# Patient Record
(demographics unavailable — no encounter records)

---

## 2025-06-26 NOTE — PHYSICAL EXAM
[de-identified] : Physical exam demonstrates the patient to be alert and oriented x 3 and capable of ambulation. The patient is well-developed and well-nourished in no apparent respiratory distress. The majority of the skin is intact bilaterally in the upper extremities without any bilateral elbow lymphadenopathy.  Full, symmetric digital range of motion.  Tender to palpation over the right thumb A1 pulley, locking can be appreciated.  Nontender over the MCP joint/collateral ligament.  No collateral ligament instability or extensor tendon subluxation.  Flexor and extensor tendons intact.  Sensation is intact to light touch.  All fingers are well-perfused.  [de-identified] : PA, lateral and oblique x-rays of the right thumb were obtained today to assess for bony injury.  No evidence of acute fracture or dislocation.

## 2025-06-26 NOTE — HISTORY OF PRESENT ILLNESS
[FreeTextEntry1] : The patient presents for follow up visit in regard to a new concern involving her right thumb tightness, pain and "pulling" sensation that stated a few weeks ago. The patient states that she constantly using a knife at work and feels discomfort when applying pressure.  The patient states that this limits her normal activities.   The patient underwent a right and left carpal tunnel injection on May 1, 2025, with relief of symptoms.  The patient also underwent a right and left long finger trigger finger injection at the same visit.

## 2025-06-26 NOTE — PROCEDURE
[FreeTextEntry1] : My impression is that the patient has stenosing tenosynovitis of the right thumb. We discussed treatment options and she elected to undergo a trigger thumb injection. The risks, benefits, and alternatives were discussed with the patient. This included, but was not limited to nerve injury, infection, subcutaneous atrophy, skin depigmentation etc. Under informed consent and sterile conditions the flexor tendon sheath at the A1 pulley was injected with a combination of 1/2 cc Kenalog-10 and 1/2 cc of 2% plain lidocaine. It is my hopes that this significantly alleviates the patients symptoms.  11.2   7.50  )-----------( 95       ( 20 Apr 2021 06:44 )             37.1       04-19    141  |  103  |  30<H>  ----------------------------<  126<H>  4.4   |  25  |  1.09    Ca    9.4      19 Apr 2021 21:34    TPro  6.9  /  Alb  3.6  /  TBili  0.5  /  DBili  x   /  AST  23  /  ALT  16  /  AlkPhos  95  04-19              Urinalysis Basic - ( 19 Apr 2021 23:22 )    Color: Yellow / Appearance: Clear / SG: >=1.030 / pH: x  Gluc: x / Ketone: Trace mg/dL  / Bili: Negative / Urobili: 0.2 E.U./dL   Blood: x / Protein: 100 mg/dL / Nitrite: POSITIVE   Leuk Esterase: NEGATIVE / RBC: 5-10 /HPF / WBC 5-10 /HPF   Sq Epi: x / Non Sq Epi: Moderate /HPF / Bacteria: Present /HPF        PT/INR - ( 19 Apr 2021 21:34 )   PT: 15.0 sec;   INR: 1.26          PTT - ( 19 Apr 2021 21:34 )  PTT:33.3 sec    Lactate Trend  04-20 @ 01:37 Lactate:1.7   04-19 @ 21:35 Lactate:2.9       CARDIAC MARKERS ( 19 Apr 2021 21:34 )  x     / 0.08 ng/mL / 77 U/L / x     / 1.8 ng/mL        CAPILLARY BLOOD GLUCOSE

## 2025-06-26 NOTE — ASSESSMENT
[FreeTextEntry1] : My impression is the patient has a right trigger thumb.  Initial treatment options were discussed and shared decision making was made to proceed with a corticosteroid injection into the FPL tendon sheath today.  I explained that recurrence of symptoms is not uncommon.  If this were to happen, consideration for another injection can be made.  I did note that multiple, repeated injections is not recommended and surgery should be considered for persistently recurrent symptoms.  All questions were answered and the patient was directed to follow-up with me on an as-needed basis.

## 2025-06-26 NOTE — PHYSICAL EXAM
[de-identified] : Physical exam demonstrates the patient to be alert and oriented x 3 and capable of ambulation. The patient is well-developed and well-nourished in no apparent respiratory distress. The majority of the skin is intact bilaterally in the upper extremities without any bilateral elbow lymphadenopathy.  Full, symmetric digital range of motion.  Tender to palpation over the right thumb A1 pulley, locking can be appreciated.  Nontender over the MCP joint/collateral ligament.  No collateral ligament instability or extensor tendon subluxation.  Flexor and extensor tendons intact.  Sensation is intact to light touch.  All fingers are well-perfused.  [de-identified] : PA, lateral and oblique x-rays of the right thumb were obtained today to assess for bony injury.  No evidence of acute fracture or dislocation.

## 2025-06-26 NOTE — PROCEDURE
[FreeTextEntry1] : My impression is that the patient has stenosing tenosynovitis of the right thumb. We discussed treatment options and she elected to undergo a trigger thumb injection. The risks, benefits, and alternatives were discussed with the patient. This included, but was not limited to nerve injury, infection, subcutaneous atrophy, skin depigmentation etc. Under informed consent and sterile conditions the flexor tendon sheath at the A1 pulley was injected with a combination of 1/2 cc Kenalog-10 and 1/2 cc of 2% plain lidocaine. It is my hopes that this significantly alleviates the patients symptoms.